# Patient Record
Sex: FEMALE | Race: ASIAN | URBAN - METROPOLITAN AREA
[De-identification: names, ages, dates, MRNs, and addresses within clinical notes are randomized per-mention and may not be internally consistent; named-entity substitution may affect disease eponyms.]

---

## 2019-09-23 ENCOUNTER — OTHER- (OUTPATIENT)
Dept: URBAN - METROPOLITAN AREA CLINIC 8 | Facility: CLINIC | Age: 29
Setting detail: DERMATOLOGY
End: 2019-09-23

## 2019-09-23 DIAGNOSIS — L57.0 ACTINIC KERATOSIS: ICD-10-CM

## 2019-09-23 PROCEDURE — 99201 LEVEL 1 - NEW: CPT

## 2024-01-26 ENCOUNTER — EMERGENCY (EMERGENCY)
Facility: HOSPITAL | Age: 34
LOS: 1 days | Discharge: HOME | End: 2024-01-26
Admitting: EMERGENCY MEDICINE
Payer: COMMERCIAL

## 2024-01-26 VITALS
OXYGEN SATURATION: 99 % | DIASTOLIC BLOOD PRESSURE: 110 MMHG | HEART RATE: 98 BPM | TEMPERATURE: 98 F | RESPIRATION RATE: 18 BRPM | SYSTOLIC BLOOD PRESSURE: 152 MMHG

## 2024-01-26 PROCEDURE — 73564 X-RAY EXAM KNEE 4 OR MORE: CPT | Mod: 26,LT

## 2024-01-26 PROCEDURE — 99283 EMERGENCY DEPT VISIT LOW MDM: CPT

## 2024-01-26 NOTE — ED PROVIDER NOTE - CLINICAL SUMMARY MEDICAL DECISION MAKING FREE TEXT BOX
pt here for left knee pain x 2-3 months. atraumatic. VSS. exam found tenderness on medial left knee. full ROM of left knee. no erythema, edema. no deformity. left lower extremity is not edematous or erythematous. pt is fully weight bearing.   no concern for septic knee or fracture.   pt is obese, working as a .   likely arthritis vs meniscal injury.  will get xray of left knee.

## 2024-01-26 NOTE — ED PROVIDER NOTE - PHYSICAL EXAMINATION
CONSTITUTIONAL: Well-appearing; well-nourished; in no apparent distress. Non-toxic appearing.   NEURO: Alert & oriented. Gait steady without assistance. Sensory and motor functions are grossly intact.  PSYCH: Mood appropriate. Thought processes intact.   NECK: Supple  CARD: Regular rate and rhythm, no murmurs  RESP: No accessory muscle use; breath sounds clear and equal bilaterally; no wheezes, rhonchi, or rales     ABD: Soft; non-distended; non-tender.   MUSCULOSKELETAL/EXTREMITIES: tenderness on medial left knee. full ROM of left knee. no erythema, edema. no deformity.   SKIN: Warm; dry; no apparent lesions or exudate

## 2024-01-26 NOTE — ED PROVIDER NOTE - NSFOLLOWUPINSTRUCTIONS_ED_ALL_ED_FT
Keep extremity elevated and wrapped.  Apply cool compresses to affected area for 15 minutes, 3-4 times per day.   Take Motrin 600 mg every 8 hours as needed for moderate pain or fevers -- take with food.   Take Tylenol 650mg (Two 325 mg pills) every 4-6 hours as needed for pain or fevers.   Follow up with orthopedics (referral list provided or call [phone] to make an appointment with the orthopedics clinic) in 7-10 days.  Follow up with your primary care physician in 48-72 hours- bring copies of your results.    Return to the ER for worsening or persistent symptoms, and/or ANY NEW OR CONCERNING SYMPTOMS. If you have issues obtaining follow up, please call: [phone] to obtain a doctor or specialist who takes your insurance in your area.  You may call [phone] to make an appointment with the internal medicine clinic.

## 2024-01-26 NOTE — ED PROVIDER NOTE - OBJECTIVE STATEMENT
33 yo F with anemia, left heel spur, obesity, c/o left knee pain for 2-3 months. pain is worse with walking and prolonged standing. pt has seen her primary care provider and obtained xray of left knee 2 months ago but was told it's normal. pt was given medication for pain which made her feel better. pt works as a  at [employer]. denies fever, trauma, swelling or redness to skin.

## 2024-01-26 NOTE — ED ADULT TRIAGE NOTE - CHIEF COMPLAINT QUOTE
Pt AOX4 c/o pain in Left knee and left heel; has a diagnosed Left heel spur, pain got worse over past 2 days  Hx of high cholesterol and takes Atenolol daily for rate control r/t Hx of tachycardia

## 2024-01-26 NOTE — ED ADULT TRIAGE NOTE - BP NONINVASIVE DIASTOLIC (MM HG)
HISTORY OF PRESENT ILLNESS:  Patient presents complaining of chest congestion along with a “deep cough and some shortness of breath.  No fevers or chills.  She is had some sinus congestion and drainage.  No sick contacts or travel.  Patient did not get a flu shot.    PAST MEDICAL HISTORY:    Hyperlipidemia                                                ALLERGIES:   Allergen Reactions   • Bee Sting HIVES     Social History     Socioeconomic History   • Marital status: /Civil Union     Spouse name: Not on file   • Number of children: Not on file   • Years of education: Not on file   • Highest education level: Not on file   Occupational History   • Not on file   Tobacco Use   • Smoking status: Former   • Smokeless tobacco: Never   Substance and Sexual Activity   • Alcohol use: Yes     Alcohol/week: 4.0 standard drinks     Types: 4 Glasses of wine per week     Comment: occassional   • Drug use: No   • Sexual activity: Yes   Other Topics Concern   • Not on file   Social History Narrative   • Not on file     Social Determinants of Health     Financial Resource Strain: Not on file   Food Insecurity: Not on file   Transportation Needs: Not on file   Physical Activity: Not on file   Stress: Not on file   Social Connections: Not on file   Intimate Partner Violence: Not on file         REVIEW OF SYSTEMS:   14 point review of systems negative except as noted in the history of present illness.      PHYSICAL EXAMINATION:   Visit Vitals  /74 (BP Location: LUE - Left upper extremity, Patient Position: Sitting, Cuff Size: Regular)   Pulse 86   Temp 97.5 °F (36.4 °C)   Resp 16   Ht 5' 4\" (1.626 m)   Wt 69.6 kg (153 lb 6.4 oz)   SpO2 100%   BMI 26.33 kg/m²     General:  Well-developed, well-nourished, no acute distress.  Eyes: Normal lids, conjunctivae clear bilaterally. PERRL(Pupils equal, round, reactive to light).  ENT: External ears normal, nares symmetric, no discharge. Moist mucous membranes.  Respiratory: Normal  respiratory effort, lungs clear to auscultation bilaterally  Cardiovascular: Regular rate and rhythm, no murmurs. No LE(lower extremity) edema.  Gastrointestinal: Abdomen soft, nontender, non-distended. Active bowel sounds.   Skin: Warm, no rashes or lesions. No palpable nodules.      ASSESSMENT AND PLAN:  Acute viral syndrome.  Patient's symptoms started over a week ago so it is too late to treat either the flu or COVID.  Prednisone called to the pharmacy.  Take as directed.  Cough medication also called in.  Push clear liquids.  Rest.  Follow-up as needed      Rodríguez Guerra MD  3/8/2023     110

## 2024-01-26 NOTE — ED PROVIDER NOTE - PATIENT PORTAL LINK FT
You can access the FollowMyHealth Patient Portal offered by [facility] by registering at the following website: [email or web address]. By joining [facility]’s FollowMyHealth portal, you will also be able to view your health information using other applications (apps) compatible with our system.

## 2024-02-16 PROBLEM — E66.9 OBESITY, UNSPECIFIED: Chronic | Status: ACTIVE | Noted: 2024-01-26

## 2024-02-21 ENCOUNTER — APPOINTMENT (OUTPATIENT)
Dept: ORTHOPEDIC SURGERY | Facility: CLINIC | Age: 34
End: 2024-02-21
Payer: COMMERCIAL

## 2024-02-21 DIAGNOSIS — M25.562 PAIN IN RIGHT KNEE: ICD-10-CM

## 2024-02-21 DIAGNOSIS — M25.561 PAIN IN RIGHT KNEE: ICD-10-CM

## 2024-02-21 DIAGNOSIS — E78.00 PURE HYPERCHOLESTEROLEMIA, UNSPECIFIED: ICD-10-CM

## 2024-02-21 DIAGNOSIS — M17.12 UNILATERAL PRIMARY OSTEOARTHRITIS, LEFT KNEE: ICD-10-CM

## 2024-02-21 DIAGNOSIS — M17.11 UNILATERAL PRIMARY OSTEOARTHRITIS, RIGHT KNEE: ICD-10-CM

## 2024-02-21 DIAGNOSIS — I49.9 CARDIAC ARRHYTHMIA, UNSPECIFIED: ICD-10-CM

## 2024-02-21 PROBLEM — Z00.00 ENCOUNTER FOR PREVENTIVE HEALTH EXAMINATION: Status: ACTIVE | Noted: 2024-02-21

## 2024-02-21 PROCEDURE — 99204 OFFICE O/P NEW MOD 45 MIN: CPT

## 2024-02-22 NOTE — DISCUSSION/SUMMARY
[de-identified] : Assessment:   The patient presents with history, examination and imaging that are most consistent with a diagnosis of:  B/L knee arthritis   The patient would like to pursue conservative measures at this time. After consideration of various non-operative treatment modalities, the patient would like to proceed with the modalities listed below.   During this appointment the patient was examined, diagnoses were discussed and explained in a face to face manner. In addition extensive time was spent reviewing aforementioned diagnostic studies. Counseling including abnormal image results, differential diagnoses, treatment options, risk and benefits, lifestyle changes, current condition, and current medications was performed. Patient's comments, questions, and concerns were address and patient verbalized understanding.   ---------------------------     Plan: - Start Physical Therapy, script provided - Discussed possible CSI vs HA inj. Will discuss further at next visit    Follow-up:  2 months

## 2024-02-22 NOTE — HISTORY OF PRESENT ILLNESS
[Standing] : standing [Walking] : walking [Stairs] : stairs [] : yes [de-identified] : 02/21/2024: CRISTINE PADILLA is a 34 year old female complaining of LEFT > RIGHT knee pain x 5 months w/o injury. states pain started after left heel spur. pain worse from sitting to standing. went to [facility] ED +xrays (Prashanth) [FreeTextEntry1] : BILATERAL knee  [de-identified] : xrays

## 2024-02-22 NOTE — IMAGING
[de-identified] : The patient is a well appearing 34 year old female of their stated age. Patient ambulates with an ANTALGIC gait. Negative straight leg raise bilateral   General: in no acute distress, seated comfortably, moving easily Skin: No discoloration, rashes; on palpation skin is dry, Neuro: Normal sensation all dermatomes, motor all myotomes Vascular: Normal pulses, no edema, normal temperature Coordination and balance: Normal Psych: normal mood and affect, non pressured speech, alert and oriented x3   LEFT Knee:                        ROM:  3-120 degrees   Lachman: Negative Pivot Shift: Negative Anterior Drawer: Negative Posterior Drawer / Sag: Negative Varus Stress 0 degrees: Stable Varus Stress 30 degrees: Stable Valgus Stress 0 degrees: Stable Valgus Stress 30 degrees: Stable Medial Carmel: Positive Lateral Carmel: Negative Patella Glide: 2+ Patella Apprehension: Negative Patella Grind: Positive   Palpation: Medial Joint Line: TTP Lateral Joint Line: TTP Medial Collateral Ligament: Nontender Lateral Collateral Ligament/PLC: Nontender Distal Femur: Nontender Proximal Tibia: Nontender Tibial Tubercle: Nontender Distal Pole Patella: Nontender Quadriceps Tendon: Nontender &  Intact Patella Tendon: Nontender &  Intact Medial Distal Hamstring/PES: Nontender Lateral Distal Hamstring: Nontender & Stable Iliotibial Band: Nontender Medial Patellofemoral Ligament: Nontender Adductor: Nontender Proximal GSC-Plantaris: Nontender Calf: Supple & Nontender   Inspection: Deformity: No Erythema: No Ecchymosis: No Abrasions: No Effusion: Moderate Prepatellar Bursitis: No   Neurologic Exam: Sensation L4-S1: Grossly Intact   Motor Exam: Quadriceps: 5 out of 5 Hamstrings: 5 out of 5 EHL: 5 out of 5 FHL: 5 out of 5 TA: 5 out of 5 GS: 5 out of 5   Circulatory/Pulses: Dorsalis Pedis: 2+ Posterior Tibialis: 2+     X-RAYS of the LEFT KNEE (4 views including AP, Merchant, Tunnel, Lateral): moderate osteoarthritic changes, with spurring, joint space narrowing, subchondral cysts; no fracture or dislocation   RIGHT Knee:                         ROM:  3-120 degrees   Lachman: Negative Pivot Shift: Negative Anterior Drawer: Negative Posterior Drawer / Sag: Negative Varus Stress 0 degrees: Stable Varus Stress 30 degrees: Stable Valgus Stress 0 degrees: Stable Valgus Stress 30 degrees: Stable Medial Carmel: Positive Lateral Carmel: Negative Patella Glide: 2+ Patella Apprehension: Negative Patella Grind: Positive   Palpation: Medial Joint Line: TTP Lateral Joint Line: TTP Medial Collateral Ligament: Nontender Lateral Collateral Ligament/PLC: Nontender Distal Femur: Nontender Proximal Tibia: Nontender Tibial Tubercle: Nontender Distal Pole Patella: Nontender Quadriceps Tendon: Nontender &  Intact Patella Tendon: Nontender &  Intact Medial Distal Hamstring/PES: Nontender Lateral Distal Hamstring: Nontender & Stable Iliotibial Band: Nontender Medial Patellofemoral Ligament: Nontender Adductor: Nontender Proximal GSC-Plantaris: Nontender Calf: Supple & Nontender   Inspection: Deformity: No Erythema: No Ecchymosis: No Abrasions: No Effusion: Moderate Prepatellar Bursitis: No   Neurologic Exam: Sensation L4-S1: Grossly Intact   Motor Exam: Quadriceps: 5 out of 5 Hamstrings: 5 out of 5 EHL: 5 out of 5 FHL: 5 out of 5 TA: 5 out of 5 GS: 5 out of 5   Circulatory/Pulses: Dorsalis Pedis: 2+ Posterior Tibialis: 2+    X-RAYS of the RIGHT KNEE (4 views including AP, Merchant, Tunnel, Lateral): moderate osteoarthritic changes, with spurring, joint space narrowing, subchondral cysts; no fracture or dislocation

## 2024-02-23 ENCOUNTER — APPOINTMENT (OUTPATIENT)
Dept: ORTHOPEDIC SURGERY | Facility: CLINIC | Age: 34
End: 2024-02-23

## 2024-05-15 ENCOUNTER — APPOINTMENT (OUTPATIENT)
Dept: ORTHOPEDIC SURGERY | Facility: CLINIC | Age: 34
End: 2024-05-15

## 2024-10-31 ENCOUNTER — APPOINTMENT (OUTPATIENT)
Dept: OTOLARYNGOLOGY | Facility: CLINIC | Age: 34
End: 2024-10-31
Payer: COMMERCIAL

## 2024-10-31 ENCOUNTER — NON-APPOINTMENT (OUTPATIENT)
Age: 34
End: 2024-10-31

## 2024-10-31 VITALS
HEIGHT: 65 IN | BODY MASS INDEX: 48.82 KG/M2 | WEIGHT: 293 LBS | DIASTOLIC BLOOD PRESSURE: 84 MMHG | HEART RATE: 94 BPM | SYSTOLIC BLOOD PRESSURE: 168 MMHG

## 2024-10-31 DIAGNOSIS — R42 DIZZINESS AND GIDDINESS: ICD-10-CM

## 2024-10-31 DIAGNOSIS — E61.1 IRON DEFICIENCY: ICD-10-CM

## 2024-10-31 PROCEDURE — 92567 TYMPANOMETRY: CPT

## 2024-10-31 PROCEDURE — 99203 OFFICE O/P NEW LOW 30 MIN: CPT

## 2024-10-31 PROCEDURE — 92557 COMPREHENSIVE HEARING TEST: CPT

## 2024-10-31 RX ORDER — CHROMIUM 200 MCG
TABLET ORAL
Refills: 0 | Status: ACTIVE | COMMUNITY

## 2024-10-31 RX ORDER — MECLIZINE HYDROCHLORIDE 25 MG/1
TABLET ORAL
Refills: 0 | Status: ACTIVE | COMMUNITY

## 2025-05-22 ENCOUNTER — TRANSCRIPTION ENCOUNTER (OUTPATIENT)
Age: 35
End: 2025-05-22

## 2025-05-22 ENCOUNTER — APPOINTMENT (OUTPATIENT)
Dept: ORTHOPEDIC SURGERY | Facility: CLINIC | Age: 35
End: 2025-05-22
Payer: COMMERCIAL

## 2025-05-22 DIAGNOSIS — M17.12 UNILATERAL PRIMARY OSTEOARTHRITIS, LEFT KNEE: ICD-10-CM

## 2025-05-22 DIAGNOSIS — M17.11 UNILATERAL PRIMARY OSTEOARTHRITIS, RIGHT KNEE: ICD-10-CM

## 2025-05-22 PROCEDURE — 73564 X-RAY EXAM KNEE 4 OR MORE: CPT | Mod: 50

## 2025-05-22 PROCEDURE — 99204 OFFICE O/P NEW MOD 45 MIN: CPT

## 2025-05-22 RX ORDER — MELOXICAM 7.5 MG/1
7.5 TABLET ORAL
Qty: 30 | Refills: 0 | Status: ACTIVE | COMMUNITY
Start: 2025-05-22 | End: 1900-01-01

## 2025-07-24 ENCOUNTER — APPOINTMENT (OUTPATIENT)
Dept: ORTHOPEDIC SURGERY | Facility: CLINIC | Age: 35
End: 2025-07-24